# Patient Record
Sex: MALE | NOT HISPANIC OR LATINO | URBAN - METROPOLITAN AREA
[De-identification: names, ages, dates, MRNs, and addresses within clinical notes are randomized per-mention and may not be internally consistent; named-entity substitution may affect disease eponyms.]

---

## 2018-06-25 ENCOUNTER — IMPORTED ENCOUNTER (OUTPATIENT)
Dept: URBAN - METROPOLITAN AREA CLINIC 1 | Facility: CLINIC | Age: 63
End: 2018-06-25

## 2018-06-25 PROBLEM — E11.9: Noted: 2018-06-25

## 2018-06-25 PROBLEM — H04.123: Noted: 2018-06-25

## 2018-06-25 PROBLEM — Z79.84: Noted: 2018-06-25

## 2018-06-25 PROBLEM — H16.143: Noted: 2018-06-25

## 2018-06-25 PROBLEM — H25.813: Noted: 2018-06-25

## 2018-06-25 PROCEDURE — 92014 COMPRE OPH EXAM EST PT 1/>: CPT

## 2018-06-25 PROCEDURE — 92015 DETERMINE REFRACTIVE STATE: CPT

## 2018-06-25 NOTE — PATIENT DISCUSSION
1.  DM Type II (Oral Medication) without sign of diabetic retinopathy and no blot heme on dilated retinal examination today OU No Macular Edema:  Discussed the pathophysiology of diabetes and its effect on the eye and risk of blindness. Stressed the importance of strong glucose control. Advised of importance of at least yearly dilated examinations but to contact us immediately for any problems or concerns. 2. DANIS w/ PEK OU - LL puncta open OU. Recommend ATs QID OU routinely 3. Cataract OU: Observe for now without intervention. The patient was advised to contact us if any change or worsening of visionMRX for glasses givenReturn for an appointment in 1 year 30/glare with Dr. Fabio Jackson.

## 2021-10-05 ENCOUNTER — IMPORTED ENCOUNTER (OUTPATIENT)
Dept: URBAN - METROPOLITAN AREA CLINIC 1 | Facility: CLINIC | Age: 66
End: 2021-10-05

## 2021-10-05 PROBLEM — H16.143: Noted: 2021-10-05

## 2021-10-05 PROBLEM — H25.813: Noted: 2021-10-05

## 2021-10-05 PROBLEM — Z79.4: Noted: 2021-10-05

## 2021-10-05 PROBLEM — H04.321: Noted: 2021-10-05

## 2021-10-05 PROBLEM — H04.123: Noted: 2021-10-05

## 2021-10-05 PROBLEM — E11.9: Noted: 2021-10-05

## 2021-10-05 PROCEDURE — 99204 OFFICE O/P NEW MOD 45 MIN: CPT

## 2021-10-05 NOTE — PATIENT DISCUSSION
1.  DM Type II (Oral Medication) without sign of diabetic retinopathy and no blot heme on dilated retinal examination today OU No Macular Edema:  Discussed the pathophysiology of diabetes and its effect on the eye and risk of blindness. Stressed the importance of strong glucose control. Advised of importance of at least yearly dilated examinations but to contact us immediately for any problems or concerns. 2. Abscess at Medial [place] - Uncertain if this is Dacryocystitis or is more superficial. No drainage from inferior punctum with pressure. Begin Keflex 500mg PO QID x 7 days disp#28 (erx) and Cipro 500mg BID x 7 days disp#14 (erx). Advised hot compresses for Five minutes BID-TID. 3.  Cataract OU: Visually significant will plan Phaco when patient desires. Observe for now without intervention. The patient was advised to contact us if any change or worsening of vision4. DANIS w/ PEK OU- Recommend ATs TID OU routinelyReturn for an appointment in 1 week 10 with Dr. Jefe Grullon.

## 2021-10-05 NOTE — PATIENT DISCUSSION
Acute Dacryocystitis OD - The patient has an acute infection of the right lacrimal sac. Both systemic and topical antibiotics were prescribed. Warm compresses and massage were recommended. The patient will be followed closely.

## 2021-10-05 NOTE — PATIENT DISCUSSION
Abscess at Medial [place] - Uncertain if this is Dacryocystitis or is more superficial. No drainage from inferior punctum with pressure. Begin Keflex 500mg PO QID x 7 days disp#28 (erx) and Cipro 500mg BID x 7 days disp#14 (erx). Advised hot compresses for Five minutes BID-TID.

## 2022-04-02 ASSESSMENT — VISUAL ACUITY
OS_SC: 20/30
OS_CC: J2
OS_GLARE: 20/100
OD_GLARE: 20/100
OD_SC: 20/40
OD_SC: 20/30
OD_CC: J2
OS_GLARE: 20/100
OS_SC: 20/40-1
OS_CC: J2
OD_CC: J2
OD_GLARE: 20/100

## 2022-04-02 ASSESSMENT — TONOMETRY
OS_IOP_MMHG: 13
OD_IOP_MMHG: 13
OD_IOP_MMHG: 14
OS_IOP_MMHG: 16